# Patient Record
(demographics unavailable — no encounter records)

---

## 2024-11-12 NOTE — PHYSICAL EXAM
[de-identified] : well-developed female,  [de-identified] : nonlabored breathing  [de-identified] : ANAR [de-identified] : Right hand- volar wrist incision healed, FROM Left thumb - incision over A1 pulley healed, no triggering, no erythema, FROM

## 2024-11-12 NOTE — HISTORY OF PRESENT ILLNESS
[FreeTextEntry1] : 37 yo F with PMH of neuropathy, dysphagia and COVID-19 infection in March 2020 who is RHD and presents today for evaluation of bilateral hand hand, left significantly worse than right waking her up at night associated with tingling, numbness and hand weakness for the past 1 year. Patient had hand X-Ray which revealed arthritis and is schedule to have an EMG today per her PMD. Has been wearing a wrist brace nightly with improvement but now plateau. She has been using splint x 1 year.  She had kenalog injection x 3 with short-term relief ([facility] on [place])  Denies any hand trauma. Patient saw a neurologist for left sided weakness LUE and LLE and is due to have MRI of head and spine in the near future.   Denies falls, trauma or MVA history associated with above symptoms.  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 EMG 10/6/20 shows moderate right CTS and subacute left cervical C7 radiculopathy. MRI head and C-spine unremarkable.   Reports that right hand pain and numbness is worse.  Reports that she could not find correct wrist splint for right side.     Interval hx (1/28/21): Pt presents today POD#7 s/p right CTR. Doing well and already reporting improvement in right hand pain. Denies any f/c or bleeding. Still smoking.   Interval hx (2/4/21): Pt presents today POD#14 s/p right CTR. Offers no significant complaints. Reports improvement in hand pain and numbness.   Interval hx (3/9/21): Pt presents today 6 weeks s/p right CTR. Offers no significant complaints. Reports excellent strength and sensation. Would like to return to work.  Interval hx (3/30/21):  Here for 9 week s/p right CTR.  Also c/o right thumb pain and locking after working in [employer].  Denies thumb numbness.  Reports improving finger numbness and pain after right CTR.  No complaints left hand.  Interval hx (6/6/21):  Pt presents for f/u s/p right CTR January 2021 and right thumb TF s/p injection x 1. Reports improvement since Kenalog injection but c/o new right index finger pain and locking for 2-3 months.   Interval hx (7/22/21): Pt presents today for follow up. Reports right index kenalog injection helped significantly. Index now triggering up to 1x/week rather than daily. Right thumb also triggering no more than once a week.  C/o new daily triggering to left thumb. Also c/o decreased  strength to left hand.  Interval hx (9/2/21): Pt presents for follow up.   Interval hx (3/16/22): Pt presents for follow up. C/o significant wrist pain x2 weeks, worsening left hand paranesthesia, and persistent left thumb triggering s/p 2 injections.   Interval hx (7/30/24). Pt here for f/u s/p right CTR and left thumb TF c/o recurrent left thumb pain despite several Kenalog injections in the past. Right hand fine.   Interval hx (9/26/24). Pt presents today POD#6 s/p left thumb TFR. Doing well with improving incisional discomfort. Denies any f/c, bleeding or triggering.   Interval hx (10/3/24). Pt presents today POD#13 s/p left thumb TFR. Doing well and happy with results with no active triggering. Denies any f/c, bleeding.   Interval hx (11/12/24). Pt presents today 8 weeks s/p left thumb TFR. Doing well and happy with results with no active triggering but concerned with slight numbness at fingertip. Denies any f/c, bleeding.

## 2024-11-12 NOTE — ASSESSMENT
[FreeTextEntry1] : 39 yo F with bilateral hand pain and numbness L>R, EMG c/w moderate right CTS and subacute left cervical C7 radiculopathy s/p right CTR 1/21. Doing well.  Right trigger thumb s/p Kenalog injection 3/30/21 x1, right index TF s/p injection 6/10/21 - both resolved  Now recurrent left thumb trigger s/p 2 Kenalog injections: 7/22/21 and 9/2/21 Left wrist pain, r/o fracture vs tendonitis Left hand paranesthesia likely due to neck pathology  Now 8 weeks s/p left thumb TFR. Doing well.   - recommend silicone strip and scar massage as demonstrated  - pt reassured  - all questions were answered - f/u 2-3 months with Tray Gregory.

## 2024-12-19 NOTE — HISTORY OF PRESENT ILLNESS
[de-identified] : left  hip pain pain getting into and out of a car  NAD left hip:  no skin breakdown FF 0-110 ER 40 IR 20 positive impingement ttp hip ttp greater troch NVI comp soft and nt  mri left hip IMPRESSION: Suggestion of a focal anterior superior acetabular labral fraying/tear. No hip joint effusion with synovitis. Left hamstring origin mild tendinosis.  Plan:  went over findings explained the imaging and tx options op vs nonop cont cons tx pt script [facility]  sent for pain PM for hip injections fu in 3 months

## 2024-12-27 NOTE — PHYSICAL EXAM
[de-identified] : Left hip: Inspection: no evidence of atrophy, effusion, rash     Palpation: + tenderness over anterior hip, adductors, PSIS, gluteus, GTB    Stability: no gross instability bilaterally    Alignment: normal    ROM:   Painful reduced ROM especially flexion, external and internal rotation.      Special tests:  OZZIE + (groin pain)    Stability:  No gross instability bilaterally

## 2024-12-27 NOTE — DISCUSSION/SUMMARY
[de-identified] : A discussion regarding available pain management treatment options occurred with the patient. These included interventional, rehabilitative, pharmacological, and alternative modalities. We will proceed with the following:   Interventional treatment options: We discussed L hip inj under fluoroscopy after her 6 weeks of conservative management (pt / nsaids)  Risks include bleeding, infection, headache, worse pain, or steroid reaction - which may include flushing, difficulty sleeping, irritability, and increased blood sugar. Benefits include increased and sustainable pain relief, reduction in use of prn pain medication, possible improvement in functional status and quality of life. Alternatives include, but are not limited to home exercise regimen, physical therapy, PRN pain medications such as nsaids, neuropathic pain medications, muscle relaxants, to name a few.   Rehabilitative options: - continue with physical therapy for the left hip.   Medications: - continue with Nabumetone 500 mg q12h prn.  I advised HAROON that the NSAID should be taken with food.  In addition while taking the prescribed NSAID, no over the counter or other NSAIDs should be used, such as ibuprofen (Motrin or Advil) or naproxen (Aleve) as this can cause stomach upset or other side effects.  If needed for fever or breakthrough pain Tylenol can be used.   - Follow up in 6 weeks.   I Wendi Lion attest that this documentation has been prepared under the direction and in the presence of provider Dr. Frederic Upton.  The documentation recorded by the scribe in my presence, accurately reflects the service I personally performed, and the decisions made by me with my edits as appropriate.   Frederic Upton, DO

## 2024-12-27 NOTE — DATA REVIEWED
[FreeTextEntry1] : MRI of the lumbar spine taken on 12-6-2024 @ [facility] IMPRESSION: Multilevel shallow disc bulges without spinal canal or neuroforaminal stenosis.  MRI of the left hip taken on 12-5-2024 @ [facility] IMPRESSION: Suggestion of a focal anterior superior acetabular labral fraying/tear. No hip joint effusion with synovitis. Left hamstring origin mild tendinosis.

## 2024-12-27 NOTE — HISTORY OF PRESENT ILLNESS
[FreeTextEntry1] : Ms. Griffith is a 40-year-old female presenting to establish care for her left hip pain. The pain started a couple of months ago without any inciting event. She denies any trauma or any injuries. She has been experiencing pain in the left hip with referred pain into the left groin and occasionally into the anterior portion of the thigh. There is occasional pain when sitting. She has some pain when walking. She does walk with a limp. She is very stiff when she wakes up in the morning. She has to slowly get up out of bed. She has significant pain when transitioning from a seated to standing position. She does also afford pain in the left side of the lower lumbar spine. She rates the pain at a 7/10 on the pain scale. The pain is present daily and limits her ADLs. She is still working full time in the hospital. She has been undergoing physical therapy twice weekly. She has been managing her pain with NSAIDs.

## 2025-01-14 NOTE — HISTORY OF PRESENT ILLNESS
[FreeTextEntry1] : 37 yo F with PMH of neuropathy, dysphagia and COVID-19 infection in March 2020 who is RHD and presents today for evaluation of bilateral hand hand, left significantly worse than right waking her up at night associated with tingling, numbness and hand weakness for the past 1 year. Patient had hand X-Ray which revealed arthritis and is schedule to have an EMG today per her PMD. Has been wearing a wrist brace nightly with improvement but now plateau. She has been using splint x 1 year.  She had kenalog injection x 3 with short-term relief ([facility] on [place])  Denies any hand trauma. Patient saw a neurologist for left sided weakness LUE and LLE and is due to have MRI of head and spine in the near future.   Denies falls, trauma or MVA history associated with above symptoms.  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 EMG 10/6/20 shows moderate right CTS and subacute left cervical C7 radiculopathy. MRI head and C-spine unremarkable.   Reports that right hand pain and numbness is worse.  Reports that she could not find correct wrist splint for right side.     Interval hx (1/28/21): Pt presents today POD#7 s/p right CTR. Doing well and already reporting improvement in right hand pain. Denies any f/c or bleeding. Still smoking.   Interval hx (2/4/21): Pt presents today POD#14 s/p right CTR. Offers no significant complaints. Reports improvement in hand pain and numbness.   Interval hx (3/9/21): Pt presents today 6 weeks s/p right CTR. Offers no significant complaints. Reports excellent strength and sensation. Would like to return to work.  Interval hx (3/30/21):  Here for 9 week s/p right CTR.  Also c/o right thumb pain and locking after working in [employer].  Denies thumb numbness.  Reports improving finger numbness and pain after right CTR.  No complaints left hand.  Interval hx (6/6/21):  Pt presents for f/u s/p right CTR January 2021 and right thumb TF s/p injection x 1. Reports improvement since Kenalog injection but c/o new right index finger pain and locking for 2-3 months.   Interval hx (7/22/21): Pt presents today for follow up. Reports right index kenalog injection helped significantly. Index now triggering up to 1x/week rather than daily. Right thumb also triggering no more than once a week.  C/o new daily triggering to left thumb. Also c/o decreased  strength to left hand.  Interval hx (9/2/21): Pt presents for follow up.   Interval hx (3/16/22): Pt presents for follow up. C/o significant wrist pain x2 weeks, worsening left hand paranesthesia, and persistent left thumb triggering s/p 2 injections.   Interval hx (7/30/24). Pt here for f/u s/p right CTR and left thumb TF c/o recurrent left thumb pain despite several Kenalog injections in the past. Right hand fine.   Interval hx (9/26/24). Pt presents today POD#6 s/p left thumb TFR. Doing well with improving incisional discomfort. Denies any f/c, bleeding or triggering.   Interval hx (10/3/24). Pt presents today POD#13 s/p left thumb TFR. Doing well and happy with results with no active triggering. Denies any f/c, bleeding.   Interval hx (11/12/24). Pt presents today 8 weeks s/p left thumb TFR. Doing well and happy with results with no active triggering but concerned with slight numbness at fingertip. Denies any f/c, bleeding.   Interval hx (1/14/25). Pt presents today 4 months s/p left thumb TFR. Doing well with no complaints. Did not get silicone tape but is massaging the scar as instructed.

## 2025-01-14 NOTE — ASSESSMENT
[FreeTextEntry1] : 39 yo F with bilateral hand pain and numbness L>R, EMG c/w moderate right CTS and subacute left cervical C7 radiculopathy s/p right CTR 1/21. Doing well.  Right trigger thumb s/p Kenalog injection 3/30/21 x1, right index TF s/p injection 6/10/21 - both resolved  Now recurrent left thumb trigger s/p 2 Kenalog injections: 7/22/21 and 9/2/21 Left wrist pain, r/o fracture vs tendonitis Left hand paranesthesia likely due to neck pathology  Now 4 months s/p left thumb TFR. Doing well.   - recommend silicone strip and scar massage  - all questions were answered - f/u PRN   as above doing very well no more triggering prior rigth CTR fine--no sxs no CTS onleft  pt very happy  she will f/u as needed

## 2025-01-14 NOTE — PHYSICAL EXAM
[de-identified] : well-developed female,  [de-identified] : nonlabored breathing  [de-identified] : ANAR [de-identified] : Right hand- volar wrist incision healed, FROM Left thumb - incision over A1 pulley healed, no triggering, no erythema, FROM

## 2025-02-07 NOTE — PHYSICAL EXAM
[de-identified] : Left hip: Inspection: no evidence of atrophy, effusion, rash     Palpation: + tenderness over anterior hip, adductors, PSIS, gluteus, GTB    Stability: no gross instability bilaterally    Alignment: normal    ROM:   Painful reduced ROM especially flexion, external and internal rotation.      Special tests:  OZZIE + (groin pain)    Stability:  No gross instability bilaterally

## 2025-02-07 NOTE — DISCUSSION/SUMMARY
[de-identified] : A discussion regarding available pain management treatment options occurred with the patient. These included interventional, rehabilitative, pharmacological, and alternative modalities. We will proceed with the following:   Interventional treatment options: 1. Proceed with a Left hip injection under fluoroscopic guidance.  The patient is having significant left hip pain with minimal relief with conservative treatments so we decided to proceed with an intra-articular hip injection. The risks and benefits were discussed which included bruising, hematoma, worse pain, intravascular injection, steroid reaction. All questions were answered and concerns addressed. The patient understands that this is likely a temporary solution to their pain. The patient may have up to three intra-articular joint injections a year and needs to consider surgical options for longer term relief of pain should they not improve. They will schedule at the earliest convenience.   - Follow up 2 weeks post injection.   I Wendi Lion attest that this documentation has been prepared under the direction and in the presence of provider Dr. Frederic Upton.  The documentation recorded by the scribe in my presence, accurately reflects the service I personally performed, and the decisions made by me with my edits as appropriate.   Frederic Upton, DO

## 2025-02-07 NOTE — HISTORY OF PRESENT ILLNESS
[FreeTextEntry1] : Ms. Griffith is a 40-year-old female presenting to establish care for her left hip pain. The pain started a couple of months ago without any inciting event. She denies any trauma or any injuries. She has been experiencing pain in the left hip with referred pain into the left groin and occasionally into the anterior portion of the thigh. There is occasional pain when sitting. She has some pain when walking. She does walk with a limp. She is very stiff when she wakes up in the morning. She has to slowly get up out of bed. She has significant pain when transitioning from a seated to standing position. She does also afford pain in the left side of the lower lumbar spine. She rates the pain at a 7/10 on the pain scale. The pain is present daily and limits her ADLs. She is still working full time in the hospital. She has been undergoing physical therapy twice weekly. She has been managing her pain with NSAIDs.   2-7-2025: Revisit encounter.   She is presenting today with ongoing severe pain in the left hip. She has been undergoing physical therapy weekly along with taking Nabumetone 750 mg with no sustained improvement in pain or function. Her pain is referring into the groin and into the anterior aspect of the thigh. Her pain is the most severe when weight bearing. She does note pain when sitting especially for prolonged periods of time. The most severe of her pain is when waking up first thing in the morning and getting up out of bed. Her pain is present all throughout the day and limits her ADLs. She describes the pain as sharp, throbbing, shooting and aching. She rates the pain at a 9/10 on the pain scale.